# Patient Record
Sex: FEMALE | Race: BLACK OR AFRICAN AMERICAN | NOT HISPANIC OR LATINO | URBAN - METROPOLITAN AREA
[De-identification: names, ages, dates, MRNs, and addresses within clinical notes are randomized per-mention and may not be internally consistent; named-entity substitution may affect disease eponyms.]

---

## 2017-10-24 ENCOUNTER — AMBULATORY SURGICAL CENTER (AMBULATORY)
Dept: URBAN - METROPOLITAN AREA SURGERY 17 | Facility: SURGERY | Age: 47
End: 2017-10-24
Payer: COMMERCIAL

## 2017-10-24 ENCOUNTER — OFFICE (AMBULATORY)
Dept: URBAN - METROPOLITAN AREA CLINIC 64 | Facility: CLINIC | Age: 47
End: 2017-10-24
Payer: COMMERCIAL

## 2017-10-24 VITALS
SYSTOLIC BLOOD PRESSURE: 142 MMHG | DIASTOLIC BLOOD PRESSURE: 81 MMHG | OXYGEN SATURATION: 100 % | RESPIRATION RATE: 20 BRPM | HEART RATE: 79 BPM

## 2017-10-24 VITALS
HEART RATE: 81 BPM | RESPIRATION RATE: 20 BRPM | DIASTOLIC BLOOD PRESSURE: 86 MMHG | OXYGEN SATURATION: 98 % | SYSTOLIC BLOOD PRESSURE: 142 MMHG

## 2017-10-24 VITALS
DIASTOLIC BLOOD PRESSURE: 75 MMHG | SYSTOLIC BLOOD PRESSURE: 118 MMHG | RESPIRATION RATE: 17 BRPM | HEART RATE: 72 BPM | OXYGEN SATURATION: 100 %

## 2017-10-24 VITALS
RESPIRATION RATE: 14 BRPM | HEART RATE: 74 BPM | DIASTOLIC BLOOD PRESSURE: 95 MMHG | SYSTOLIC BLOOD PRESSURE: 143 MMHG | TEMPERATURE: 98.5 F | OXYGEN SATURATION: 96 %

## 2017-10-24 VITALS
RESPIRATION RATE: 19 BRPM | SYSTOLIC BLOOD PRESSURE: 118 MMHG | DIASTOLIC BLOOD PRESSURE: 75 MMHG | HEART RATE: 77 BPM | OXYGEN SATURATION: 99 %

## 2017-10-24 VITALS
OXYGEN SATURATION: 97 % | RESPIRATION RATE: 16 BRPM | HEART RATE: 73 BPM | SYSTOLIC BLOOD PRESSURE: 134 MMHG | DIASTOLIC BLOOD PRESSURE: 88 MMHG

## 2017-10-24 VITALS
RESPIRATION RATE: 20 BRPM | DIASTOLIC BLOOD PRESSURE: 82 MMHG | SYSTOLIC BLOOD PRESSURE: 133 MMHG | OXYGEN SATURATION: 87 % | HEART RATE: 82 BPM

## 2017-10-24 VITALS
HEART RATE: 78 BPM | RESPIRATION RATE: 16 BRPM | SYSTOLIC BLOOD PRESSURE: 131 MMHG | DIASTOLIC BLOOD PRESSURE: 77 MMHG | OXYGEN SATURATION: 99 %

## 2017-10-24 VITALS — HEART RATE: 72 BPM | OXYGEN SATURATION: 98 %

## 2017-10-24 VITALS
OXYGEN SATURATION: 100 % | HEART RATE: 77 BPM | SYSTOLIC BLOOD PRESSURE: 131 MMHG | RESPIRATION RATE: 15 BRPM | DIASTOLIC BLOOD PRESSURE: 74 MMHG

## 2017-10-24 VITALS
HEART RATE: 77 BPM | SYSTOLIC BLOOD PRESSURE: 127 MMHG | DIASTOLIC BLOOD PRESSURE: 93 MMHG | OXYGEN SATURATION: 100 % | RESPIRATION RATE: 11 BRPM

## 2017-10-24 VITALS — RESPIRATION RATE: 16 BRPM | OXYGEN SATURATION: 97 % | TEMPERATURE: 96.8 F | HEART RATE: 83 BPM

## 2017-10-24 DIAGNOSIS — K21.0 GASTRO-ESOPHAGEAL REFLUX DISEASE WITH ESOPHAGITIS: ICD-10-CM

## 2017-10-24 DIAGNOSIS — K29.70 GASTRITIS, UNSPECIFIED, WITHOUT BLEEDING: ICD-10-CM

## 2017-10-24 DIAGNOSIS — Z12.11 ENCOUNTER FOR SCREENING FOR MALIGNANT NEOPLASM OF COLON: ICD-10-CM

## 2017-10-24 DIAGNOSIS — K64.0 FIRST DEGREE HEMORRHOIDS: ICD-10-CM

## 2017-10-24 DIAGNOSIS — K29.50 UNSPECIFIED CHRONIC GASTRITIS WITHOUT BLEEDING: ICD-10-CM

## 2017-10-24 DIAGNOSIS — K57.30 DIVERTICULOSIS OF LARGE INTESTINE WITHOUT PERFORATION OR ABS: ICD-10-CM

## 2017-10-24 LAB
GI HISTOLOGY: A. SELECT: (no result)
GI HISTOLOGY: B. SELECT: (no result)
GI HISTOLOGY: PDF REPORT: (no result)

## 2017-10-24 PROCEDURE — 43239 EGD BIOPSY SINGLE/MULTIPLE: CPT | Performed by: INTERNAL MEDICINE

## 2017-10-24 PROCEDURE — 45378 DIAGNOSTIC COLONOSCOPY: CPT | Performed by: INTERNAL MEDICINE

## 2017-10-24 PROCEDURE — 88305 TISSUE EXAM BY PATHOLOGIST: CPT | Performed by: INTERNAL MEDICINE

## 2017-10-24 RX ADMIN — PROPOFOL 50 MG: 10 INJECTION, EMULSION INTRAVENOUS at 13:50

## 2017-10-24 RX ADMIN — PROPOFOL 50 MG: 10 INJECTION, EMULSION INTRAVENOUS at 13:39

## 2017-10-24 RX ADMIN — PROPOFOL 100 MG: 10 INJECTION, EMULSION INTRAVENOUS at 13:36

## 2017-10-24 RX ADMIN — PROPOFOL 50 MG: 10 INJECTION, EMULSION INTRAVENOUS at 13:48

## 2017-10-24 RX ADMIN — PROPOFOL 50 MG: 10 INJECTION, EMULSION INTRAVENOUS at 13:43

## 2017-10-24 RX ADMIN — PROPOFOL 50 MG: 10 INJECTION, EMULSION INTRAVENOUS at 13:54

## 2017-10-24 RX ADMIN — PROPOFOL 25 MG: 10 INJECTION, EMULSION INTRAVENOUS at 13:37

## 2017-10-24 RX ADMIN — PROPOFOL 25 MG: 10 INJECTION, EMULSION INTRAVENOUS at 13:46

## 2017-10-24 RX ADMIN — LIDOCAINE HYDROCHLORIDE 25 MG: 10 INJECTION, SOLUTION EPIDURAL; INFILTRATION; INTRACAUDAL; PERINEURAL at 13:34

## 2017-10-24 RX ADMIN — PROPOFOL 50 MG: 10 INJECTION, EMULSION INTRAVENOUS at 13:37

## 2017-10-24 NOTE — SERVICEHPINOTES
Patient presents for a Bypass screening Colonoscopy.  New patient paperwork has been reviewed.Pre-operatively, I reviewed the indication(s) for the procedure, the risks of the procedure [including but not limited to: unexpected bleeding possibly requiring hospitalization and/or an unplanned repeat colonoscopy, perforation possibly requiring surgical treatment, missed lesions and complications of sedation/MAC [also explained by anesthesia staff].    Having an EGD also because of heartburn.  She mentions some abdominal pain at times ( menstrual like ),,She had CT this month and states it was "unremarkable" ****BR

## 2017-10-24 NOTE — SERVICENOTES
Patient with significant sigmoid tortuousity,,,,I suspect this is causing a lot of her abdominal pain, bowel concerns. ??? underlying adhesions ? ? 
Patient to f/u in office so we can review studies,,,and review her previous CT. 

Patient understands associated risk, benefit of endoscopy including bleeding, infection, missed polyp or missed cancer, perforation, missed lesion, death. Withdrawal time was > / = 6 minutes.

## 2017-12-06 ENCOUNTER — OFFICE (AMBULATORY)
Dept: URBAN - METROPOLITAN AREA CLINIC 75 | Facility: CLINIC | Age: 47
End: 2017-12-06

## 2017-12-06 VITALS — HEART RATE: 80 BPM | DIASTOLIC BLOOD PRESSURE: 92 MMHG | WEIGHT: 226 LBS | SYSTOLIC BLOOD PRESSURE: 142 MMHG

## 2017-12-06 DIAGNOSIS — K20.9 ESOPHAGITIS, UNSPECIFIED: ICD-10-CM

## 2017-12-06 DIAGNOSIS — K57.30 DIVERTICULOSIS OF LARGE INTESTINE WITHOUT PERFORATION OR ABS: ICD-10-CM

## 2017-12-06 PROCEDURE — 99213 OFFICE O/P EST LOW 20 MIN: CPT | Performed by: INTERNAL MEDICINE

## 2018-12-10 ENCOUNTER — OFFICE (AMBULATORY)
Dept: URBAN - METROPOLITAN AREA CLINIC 75 | Facility: CLINIC | Age: 48
End: 2018-12-10

## 2018-12-10 VITALS
HEIGHT: 62 IN | HEART RATE: 84 BPM | DIASTOLIC BLOOD PRESSURE: 82 MMHG | WEIGHT: 208 LBS | RESPIRATION RATE: 16 BRPM | SYSTOLIC BLOOD PRESSURE: 122 MMHG

## 2018-12-10 DIAGNOSIS — K57.30 DIVERTICULOSIS OF LARGE INTESTINE WITHOUT PERFORATION OR ABS: ICD-10-CM

## 2018-12-10 DIAGNOSIS — K21.9 GASTRO-ESOPHAGEAL REFLUX DISEASE WITHOUT ESOPHAGITIS: ICD-10-CM

## 2018-12-10 PROCEDURE — 99213 OFFICE O/P EST LOW 20 MIN: CPT | Performed by: INTERNAL MEDICINE

## 2021-11-29 ENCOUNTER — TELEPHONE (OUTPATIENT)
Dept: ORTHOPEDIC SURGERY | Facility: CLINIC | Age: 51
End: 2021-11-29

## 2021-11-29 NOTE — TELEPHONE ENCOUNTER
HUB STAFF ATTEMPTED WARM TRANSFER TO [facility] FOR URGENT SCHEDULING (DUE TO CONSTANT SWELLING SINCE OCT 2021) - NO ANSWER      Caller: Genesis Landrum    Relationship to patient: Self    Best call back number: [phone]     Chief complaint: WANTS NEW PATIENT / 2ND OPINION / RIGHT KNEE APPT     Type of visit: NEW PATIENT / 2ND OPINION / RIGHT KNEE OSTEOARTHRITIS / CONSTANT PAIN & SWELLING SINCE 10/31/21 / NO IMAGING YET / NO PRIOR RIGHT KNEE SURGERY     Requested date: GENERALLY AVAILABLE ANY DAY, PREFERS LATE AFTERNOONS, DEPENDS ON WORK SCHEDULE     Additional notes: PREVIOUSLY SAW DR. GABRIEL BENSON OCT 2021 @[facility] [address] [place] [address] PH: [phone] (NO RIGHT KNEE XR NOR ASPIRATION DONE, JUST RIGHT KNEE CORTISONE INJECTION GIVEN). PATIENT GIVEN [facility] FAX # TO HAVE OFC NOTE(s) FAXED     PATIENT WOULD LIKE TO SEE DR. WESTON OR MAXINE (1ST AVAILABLE @ ANY OFFICE, ALTHO WOULD PREFER [facility] OR [facility])     REFERRAL [id number] CREATED & PUT IN SCHEDULE REVIEW FOR OFFICE SCHEDULING     PATIENT'S Best call back number: [phone]     THANKS

## 2021-12-02 ENCOUNTER — OFFICE VISIT (OUTPATIENT)
Dept: ORTHOPEDIC SURGERY | Facility: CLINIC | Age: 51
End: 2021-12-02

## 2021-12-02 VITALS — TEMPERATURE: 98.6 F | HEIGHT: 62 IN | BODY MASS INDEX: 42.36 KG/M2 | WEIGHT: 230.2 LBS

## 2021-12-02 DIAGNOSIS — S83.241A ACUTE MEDIAL MENISCUS TEAR OF RIGHT KNEE, INITIAL ENCOUNTER: Primary | ICD-10-CM

## 2021-12-02 DIAGNOSIS — M25.561 RIGHT KNEE PAIN, UNSPECIFIED CHRONICITY: ICD-10-CM

## 2021-12-02 PROCEDURE — 73562 X-RAY EXAM OF KNEE 3: CPT | Performed by: NURSE PRACTITIONER

## 2021-12-02 PROCEDURE — 99204 OFFICE O/P NEW MOD 45 MIN: CPT | Performed by: NURSE PRACTITIONER

## 2021-12-02 RX ORDER — CETIRIZINE HYDROCHLORIDE 5 MG/1
5 TABLET ORAL DAILY
COMMUNITY

## 2021-12-02 RX ORDER — IBUPROFEN 200 MG
200 TABLET ORAL EVERY 6 HOURS PRN
COMMUNITY
End: 2022-05-11 | Stop reason: ALTCHOICE

## 2021-12-02 RX ORDER — HYDROCHLOROTHIAZIDE 25 MG/1
25 TABLET ORAL DAILY
COMMUNITY
Start: 2021-11-15

## 2021-12-02 RX ORDER — MELOXICAM 15 MG/1
TABLET ORAL
Qty: 30 TABLET | Refills: 0 | Status: SHIPPED | OUTPATIENT
Start: 2021-12-02 | End: 2021-12-28

## 2021-12-02 RX ORDER — NORGESTIMATE AND ETHINYL ESTRADIOL 7DAYSX3 28
KIT ORAL
COMMUNITY
Start: 2021-11-22

## 2021-12-02 NOTE — PROGRESS NOTES
[facility]  New Patient      Patient Name: Genesis Landrum  : 1970  Primary Care Physician: Loren Pinedo MD        Chief Complaint: Right knee pain    HPI:   Genesis Landrum is a 51 y.o. year old who presents today for evaluation of right knee pain.  Patient comes to us for evaluation and treatment of right knee pain.  She has a history of left knee arthroscopy for torn meniscus performed by Dr. Novoa earlier this year and did well.  He says that her right knee is typically her good knee she is not had any problems with it since 2018 or  but started bothering her again about a month ago.  He went to see Dr. Claros and received a cortisone injection in both knees 1 month ago.  She says the left knee felt much better however the right knee did not get any relief from that injection injection whatsoever.    She says the right knee has a lot of swelling and pain, she has instability as well as nighttime symptoms.  Her pain is worse with activity only somewhat better with rest.  She endorses some stiffness.  She does feel like it gets stuck and locked up on her a bit at times.    She is done physical therapy on both of her knees within the last year.  She also tries to remain active and is tried to continue with swimming and Pilates however she feels like this is making her knee pain worse at this time.  She is taken ibuprofen for her symptoms all without improvement in a lasting fashion.    Past Medical/Surgical, Social and Family History:  I have reviewed and/or updated pertinent history as noted in the medical record including:  History reviewed. No pertinent past medical history.  Past Surgical History:   Procedure Laterality Date   • KNEE SURGERY       Social History     Occupational History   • Not on file   Tobacco Use   • Smoking status: Former Smoker     Quit date:      Years since quitting: 10.9   • Smokeless tobacco: Never Used   Vaping Use   • Vaping Use: Never used   Substance and Sexual  Activity   • Alcohol use: Not on file     Comment: weekends   • Drug use: Defer   • Sexual activity: Defer      Social History     Social History Narrative   • Not on file     Family History   Problem Relation Age of Onset   • Diabetes Mother    • Cancer Maternal Aunt         breast       Allergies:   Allergies   Allergen Reactions   • Shellfish-Derived Products Nausea And Vomiting       Medications:   Home Medications:  Current Outpatient Medications on File Prior to Visit   Medication Sig   • cetirizine (zyrTEC) 5 MG tablet Take 5 mg by mouth Daily.   • hydroCHLOROthiazide (HYDRODIURIL) 25 MG tablet Take 25 mg by mouth Daily.   • ibuprofen (ADVIL,MOTRIN) 200 MG tablet Take 200 mg by mouth Every 6 (Six) Hours As Needed for Mild Pain .   • Tri-Sprintec 0.18/0.215/0.25 MG-35 MCG per tablet TAKE 1 TABLET BY MOUTH 1 TIME EACH DAY     No current facility-administered medications on file prior to visit.         ROS:  14 point review of systems was negative except as listed in the HPI and fatigue and weight change.    Physical Exam:   51 y.o. female  Body mass index is 42.1 kg/m²., 104 kg (230 lb 3.2 oz)  Vitals:    12/02/21 1401   Temp: 98.6 °F (37 °C)     General: Alert, cooperative, appears well and in no observable distress. Appears stated age and BMI as listed above.  HEENT: Normocephalic, atraumatic on external visual inspection.  CV: No significant peripheral edema.  Respiratory: Normal respiratory effort.  Skin: Warm & well perfused; appropriate skin turgor.  Psych: Appropriate mood & affect.  Neuro: Gross sensation and motor intact in affected extremity/extremities.  Vascular: Peripheral pulses palpable in affected extremity/extremities.     MSK Exam:  RIGHT Knee  No wounds, no gross deformity. 1+ effusion. Tenderness to palpation medial compartment, patellofemoral compartment ROM 2 to 110 and asymmetric with contralateral extremity Grossly stable ligamentous exam Misty Positive Medial Bounce Home  positive      Brief hip exam in the affected extremity(ies) grossly unremarkable. No groin pain elicited. Bilateral ankles with painless ROM, grossly symmetric. Calves soft and compressible, compartments non-tender in B/L lower extremities.      Radiology:    The following X-rays were ordered/reviewed today to evaluate the patient's symptoms: Single Knee: AP standing and sunrise views of both knees, and lateral view of painful knee show On the AP view patient has moderate medial compartment narrowing bilaterally which seems about symmetric, perhaps her left knee slightly more narrow than the right.  She is got more significant patellofemoral degenerative changes with some evidence of subchondral cyst behind the kneecap.  Sunrise view in both knees show slight lateralization left more so than right with degenerative changes moderate.. There are no prior films available for direct comparison.    Procedure:   N/A      Misc. Data/Labs: N/A    Assessment & Plan:    This is a 51-year-old female with significant right knee pain.  She does have some degenerative changes but she also certainly has symptoms which sound more mechanical in nature.  I worry about a medial meniscus tear perhaps a loose body in the joint.  Given the fact she got no relief from the injection of cortisone 1 month ago would make me think this is more mechanical rather than isolated degenerative pathology.     Plan is to proceed with an MRI of the right knee she has failed conservative management.  I am going to enter in a prescription for meloxicam to so she is not having to take multiple doses of ibuprofen during the day in an effort to at least moderately control her pain.  We talked about avoiding activities which aggravate her pain and I would really encourage her not to try and push through any activities.  She could get in the pool and do some light walking wise I would continue to rest, ice and use the brace as needed.  She could certainly  even consider picking up a cane or crutch in the opposite arm to take some of the pressure off the knee as needed.    Review her MRI and consider referring her to Dr. Perry as needed if arthroscopy seems like it might be a consideration or will consider additional treatments at that time.    Patient encouraged to call with questions or concerns prior to follow up. , Patient instructed on appropriate use of NSAIDs and signs/symptoms of adverse reactions. Patient advised to stop medications and notify the office in the event of any noted side effects.  and Patient instructed on appropriate use of ICE and/or HEAT therapy, alternating for symptoms.       ICD-10-CM ICD-9-CM   1. Acute medial meniscus tear of right knee, initial encounter  S83.241A 836.0   2. Right knee pain, unspecified chronicity  M25.561 719.46     New Medications Ordered This Visit   Medications   • meloxicam (MOBIC) 15 MG tablet     Si PO Daily with food. Do not take with other NSAIDS.     Dispense:  30 tablet     Refill:  0     Orders Placed This Encounter   Procedures   • XR Knee 3 View Right   • MRI Knee Right Without Contrast     Return for After the MRI.    FILIBERTO Mcmahon      Dictated Utilizing Dragon Dictation

## 2021-12-28 ENCOUNTER — APPOINTMENT (OUTPATIENT)
Dept: MRI IMAGING | Facility: HOSPITAL | Age: 51
End: 2021-12-28

## 2021-12-28 RX ORDER — MELOXICAM 15 MG/1
TABLET ORAL
Qty: 30 TABLET | Refills: 0 | Status: SHIPPED | OUTPATIENT
Start: 2021-12-28 | End: 2022-02-07

## 2021-12-30 ENCOUNTER — HOSPITAL ENCOUNTER (OUTPATIENT)
Dept: MRI IMAGING | Facility: HOSPITAL | Age: 51
Discharge: HOME | End: 2021-12-30
Admitting: NURSE PRACTITIONER

## 2021-12-30 DIAGNOSIS — M25.561 RIGHT KNEE PAIN, UNSPECIFIED CHRONICITY: ICD-10-CM

## 2021-12-30 DIAGNOSIS — S83.241A ACUTE MEDIAL MENISCUS TEAR OF RIGHT KNEE, INITIAL ENCOUNTER: ICD-10-CM

## 2021-12-30 PROCEDURE — 73721 MRI JNT OF LWR EXTRE W/O DYE: CPT

## 2022-01-07 ENCOUNTER — OFFICE VISIT (OUTPATIENT)
Dept: ORTHOPEDIC SURGERY | Facility: CLINIC | Age: 52
End: 2022-01-07

## 2022-01-07 VITALS — BODY MASS INDEX: 42.33 KG/M2 | WEIGHT: 230 LBS | HEIGHT: 62 IN | TEMPERATURE: 98 F

## 2022-01-07 DIAGNOSIS — M17.11 PRIMARY LOCALIZED OSTEOARTHROSIS OF RIGHT LOWER LEG: Primary | ICD-10-CM

## 2022-01-07 PROCEDURE — 99214 OFFICE O/P EST MOD 30 MIN: CPT | Performed by: ORTHOPAEDIC SURGERY

## 2022-01-07 NOTE — PROGRESS NOTES
"Right Knee MRI Follow Up      Patient: Genesis Landrum        YOB: 1970            Chief Complaints: right Knee pain      History of Present Illness: The patient is here follow-up of an MRI of the knee this is a patient that had right knee pain intermittently since 2019 she is a  she states over the last several months has had worsening of her pain no new history injury change in activity she does have swelling her pain is primarily medial she does have night pain. She did see Devyn who did a steroid injection with no real lasting relief and she ordered an MRI. She is here today follow-up MRI. MRI does demonstrate a medial meniscus tear but as well and more importantly she does have some extrusion of the medial meniscus she has full-thickness cartilage loss at the medial patellofemoral compartments her symptoms are moderate to severe depending on her activity worse with activity somewhat better with restShe has done therapy on both of her knees in the last year she remains active she does Pilates her past medical history is unremarkable other than obesity she has a BMI of 42      Physical Exam: 51 y.o. female  General Appearance:    Alert, cooperative, in no acute distress                   Vitals:    01/07/22 1318   Temp: 98 °F (36.7 °C)   Weight: 104 kg (230 lb)   Height: 157.5 cm (62\")   PainSc:   5        Patient is alert and read ×3 no acute distress appears her above-listed at height weight and age.  Affect is normal respiratory rate is normal unlabored. Heart rate regular rate rhythm, sclera, dentition and hearing are normal for the purpose of this exam.      Ortho Exam Physical exam of the right knee reveals no effusion, no erythema.  It mild loss of extension and full flexion  Patient has mild varus alignment.  They have mild tenderness to palpation about the medial compartment, no tenderness laterally..  The patient has a negative bounce home, negative Misty and a stable " ligamentous exam.  Quad tone is reasonable and symmetric.  There are no overlying skin changes no lymphedema no lymphadenopathy.  There is good hip range of motion which is full symmetric and asymptomatic and a normal ankle exam.    Did review previous x-rays she does have some narrowing of the joint space maybe about 25 to 50% of joint space remaining on the right medial side with some sclerosis and osteophyte formation also mild to moderate patellofemoral OA. MRIs as above and I have reviewed myself and agree and reviewed with the patient  MRI Results: MRI is as above    Procedures      Assessment/Plan: Right knee medial meniscus tear and arthritis. I do not think should benefit from arthroscopy and any lasting fashion. We did talk about her need for probable need for arthroplasty in the future we will get approval for gel is a little early for repeat steroid I will see her back after approval for gel

## 2022-01-19 ENCOUNTER — TELEPHONE (OUTPATIENT)
Dept: ORTHOPEDIC SURGERY | Facility: CLINIC | Age: 52
End: 2022-01-19

## 2022-01-19 NOTE — TELEPHONE ENCOUNTER
Caller: MONIKA ANTON   Relationship to Patient: SELF     Phone Number: [phone]  Reason for Call: PATIENT CALLING BACK TO SPEAK WITH REYMUNDO, ATTEMPTED TO WARM TRANSFER

## 2022-01-19 NOTE — TELEPHONE ENCOUNTER
Please tell her that the gel injections are not covered that her options would be steroid injection activity modification and then we probably should have her see Dr. Anne

## 2022-01-19 NOTE — TELEPHONE ENCOUNTER
Gel injections are not covered under Mrs. Landrum's plan:(     I have left a message letting patient know gel is not covered, but could someone reach out to patient regarding next step to help with knee pain.     Thank you

## 2022-02-07 RX ORDER — MELOXICAM 15 MG/1
TABLET ORAL
Qty: 30 TABLET | Refills: 0 | Status: SHIPPED | OUTPATIENT
Start: 2022-02-07 | End: 2022-05-11 | Stop reason: ALTCHOICE

## 2022-05-11 RX ORDER — MELOXICAM 15 MG/1
TABLET ORAL
Qty: 30 TABLET | Refills: 0 | Status: SHIPPED | OUTPATIENT
Start: 2022-05-11

## 2022-05-20 ENCOUNTER — CLINICAL SUPPORT (OUTPATIENT)
Dept: ORTHOPEDIC SURGERY | Facility: CLINIC | Age: 52
End: 2022-05-20

## 2022-05-20 VITALS — BODY MASS INDEX: 41.42 KG/M2 | HEIGHT: 62 IN | TEMPERATURE: 97.3 F | WEIGHT: 225.1 LBS

## 2022-05-20 DIAGNOSIS — M17.11 PRIMARY OSTEOARTHRITIS OF RIGHT KNEE: Primary | ICD-10-CM

## 2022-05-20 PROCEDURE — 20610 DRAIN/INJ JOINT/BURSA W/O US: CPT | Performed by: ORTHOPAEDIC SURGERY

## 2022-05-20 PROCEDURE — 99212 OFFICE O/P EST SF 10 MIN: CPT | Performed by: ORTHOPAEDIC SURGERY

## 2022-05-20 RX ORDER — VALACYCLOVIR HYDROCHLORIDE 1 G/1
1000 TABLET, FILM COATED ORAL DAILY
COMMUNITY
Start: 2022-01-25

## 2022-05-20 RX ADMIN — METHYLPREDNISOLONE ACETATE 80 MG: 80 INJECTION, SUSPENSION INTRA-ARTICULAR; INTRALESIONAL; INTRAMUSCULAR; SOFT TISSUE at 13:56

## 2022-05-20 RX ADMIN — LIDOCAINE HYDROCHLORIDE 2 ML: 20 INJECTION, SOLUTION EPIDURAL; INFILTRATION; INTRACAUDAL; PERINEURAL at 13:56

## 2022-05-20 NOTE — PROGRESS NOTES
Patient: Genesis Landrum  YOB: 1970  Date of Service: 2022    Chief Complaints: Right knee pain    Subjective:    History of Present Illness: Pt is seen in the office today with complaints of right knee pain I last saw her in January we did an MRI which showed a medial meniscus tear as well as significant degenerative changes her last steroid injection was in the fall we did try to get approval for [employer] insurance denied it however when she called her insurance her insurance told her if we said it is medically necessary to be covered I will try to resubmit this.          Allergies:   Allergies   Allergen Reactions   • Shellfish-Derived Products Nausea And Vomiting       Medications:   Home Medications:  Current Outpatient Medications on File Prior to Visit   Medication Sig   • cetirizine (zyrTEC) 5 MG tablet Take 5 mg by mouth Daily.   • hydroCHLOROthiazide (HYDRODIURIL) 25 MG tablet Take 25 mg by mouth Daily.   • meloxicam (MOBIC) 15 MG tablet 1 PO Daily with food.   • Tri-Sprintec 0.18/0.215/0.25 MG-35 MCG per tablet TAKE 1 TABLET BY MOUTH 1 TIME EACH DAY     No current facility-administered medications on file prior to visit.     Current Medications:  Scheduled Meds:  Continuous Infusions:No current facility-administered medications for this visit.    PRN Meds:.    I have reviewed the patient's medical history in detail and updated the computerized patient record.  Review and summarization of old records include:    No past medical history on file.     Past Surgical History:   Procedure Laterality Date   • KNEE SURGERY          Social History     Occupational History   • Not on file   Tobacco Use   • Smoking status: Former Smoker     Quit date:      Years since quittin.3   • Smokeless tobacco: Never Used   Vaping Use   • Vaping Use: Never used   Substance and Sexual Activity   • Alcohol use: Not on file     Comment: weekends   • Drug use: Defer   • Sexual activity: Defer      Social  History     Social History Narrative   • Not on file        Family History   Problem Relation Age of Onset   • Diabetes Mother    • Cancer Maternal Aunt         breast       ROS: 14 point review of systems was performed and was negative except for documented findings in HPI and today's encounter.     Allergies:   Allergies   Allergen Reactions   • Shellfish-Derived Products Nausea And Vomiting     Constitutional:  Denies fever, shaking or chills   Eyes:  Denies change in visual acuity   HENT:  Denies nasal congestion or sore throat   Respiratory:  Denies cough or shortness of breath   Cardiovascular:  Denies chest pain or severe LE edema   GI:  Denies abdominal pain, nausea, vomiting, bloody stools or diarrhea   Musculoskeletal:  Numbness, tingling, or loss of motor function only as noted above in history of present illness.  : Denies painful urination or hematuria  Integument:  Denies rash, lesion or ulceration   Neurologic:  Denies headache or focal weakness  Endocrine:  Denies lymphadenopathy  Psych:  Denies confusion or change in mental status   Hem:  Denies active bleeding      Physical Exam: 51 y.o. female  Wt Readings from Last 3 Encounters:   01/07/22 104 kg (230 lb)   12/30/21 104 kg (230 lb)   12/02/21 104 kg (230 lb 3.2 oz)       There is no height or weight on file to calculate BMI.  No height and weight on file for this encounter.  There were no vitals filed for this visit.  Vital signs reviewed.   General Appearance:    Alert, cooperative, in no acute distress                    Ortho exam    Physical exam of the right  knee reveals no effusion no redness.  The patient does have tenderness about the medial l joint line.  No tenderness about the lateral l joint line.  A negative bounce home and a positive l medial Misty.    Patient has a stable ligamentous exam.  The patient has a negative Lachman and negative anterior drawer and a negative pivot shift.  Quads are reasonable and symmetric  bilaterally.  Calf is soft and nontender.  There is no overlying skin changes no lymphedema lymphadenopathy.  Patient has good hip range of motion full symmetric and asymptomatic and a normal ankle exam.  She has good distal pulses and sensation distally is intact             .time    Assessment: Right knee pain.  She does have a medial meniscus tear but also has degenerative changes which I think is her source plan is to proceed with an injection she states she contacted her insurance company and they said they would approved gel if we said it was medically necessary.  I told her if she has another tool in her belt we will try to resubmit   plan:   Follow up as indicated.  Ice, elevate, and rest as needed.  Discussed conservative measures of pain control including ice, bracing.  Also talked about the importance of strengthening and maintaining ideal body weight    Danyelle Perry M.D.    Large Joint Arthrocentesis: R knee  Date/Time: 5/20/2022 1:56 PM  Consent given by: patient  Site marked: site marked  Timeout: Immediately prior to procedure a time out was called to verify the correct patient, procedure, equipment, support staff and site/side marked as required   Supporting Documentation  Indications: pain, joint swelling and diagnostic evaluation   Procedure Details  Location: knee - R knee  Preparation: Patient was prepped and draped in the usual sterile fashion  Needle gauge: 21G.  Medications administered: 80 mg methylPREDNISolone acetate 80 MG/ML; 2 mL lidocaine PF 2% 2 %  Patient tolerance: patient tolerated the procedure well with no immediate complications

## 2022-05-23 RX ORDER — METHYLPREDNISOLONE ACETATE 80 MG/ML
80 INJECTION, SUSPENSION INTRA-ARTICULAR; INTRALESIONAL; INTRAMUSCULAR; SOFT TISSUE
Status: COMPLETED | OUTPATIENT
Start: 2022-05-20 | End: 2022-05-20

## 2022-05-23 RX ORDER — LIDOCAINE HYDROCHLORIDE 20 MG/ML
2 INJECTION, SOLUTION EPIDURAL; INFILTRATION; INTRACAUDAL; PERINEURAL
Status: COMPLETED | OUTPATIENT
Start: 2022-05-20 | End: 2022-05-20

## 2022-07-06 ENCOUNTER — TELEPHONE (OUTPATIENT)
Dept: ORTHOPEDIC SURGERY | Facility: CLINIC | Age: 52
End: 2022-07-06

## 2022-07-06 NOTE — TELEPHONE ENCOUNTER
Visco Referral/Denial       Insurance has denied request for Visco injection and unfortunately with the First Level of Appeal, insurance upheld the decision of denial.  (Denial letter uploaded to  for your review)     Patient is not currently scheduled and the notes states Visco is to be used as another tool if necessary.       I will reach out to the patient regarding denial and insurance could possibly review our request after another round of cortisone.

## 2022-07-07 NOTE — TELEPHONE ENCOUNTER
Her other conservative measures would be just the steroid injections physical therapy ultimately I will get her to Dr. Anne

## 2022-09-06 ENCOUNTER — TELEPHONE (OUTPATIENT)
Dept: ORTHOPEDIC SURGERY | Facility: CLINIC | Age: 52
End: 2022-09-06

## 2022-09-06 NOTE — TELEPHONE ENCOUNTER
Caller: MONIKA     Relationship to patient: SELF    Best call back number: [phone]    Chief complaint: KNEE RIGHT     Type of visit: INJECTION     Requested date: NEXT AVAIL

## 2022-09-15 ENCOUNTER — CLINICAL SUPPORT (OUTPATIENT)
Dept: ORTHOPEDIC SURGERY | Facility: CLINIC | Age: 52
End: 2022-09-15

## 2022-09-15 VITALS — WEIGHT: 222 LBS | HEIGHT: 62 IN | RESPIRATION RATE: 16 BRPM | TEMPERATURE: 98 F | BODY MASS INDEX: 40.85 KG/M2

## 2022-09-15 DIAGNOSIS — M17.11 PRIMARY OSTEOARTHRITIS OF RIGHT KNEE: Primary | ICD-10-CM

## 2022-09-15 DIAGNOSIS — S83.241D ACUTE MEDIAL MENISCUS TEAR OF RIGHT KNEE, SUBSEQUENT ENCOUNTER: ICD-10-CM

## 2022-09-15 PROCEDURE — 20610 DRAIN/INJ JOINT/BURSA W/O US: CPT | Performed by: ORTHOPAEDIC SURGERY

## 2022-09-15 RX ORDER — OMEPRAZOLE 20 MG/1
CAPSULE, DELAYED RELEASE ORAL
COMMUNITY
Start: 2022-09-09

## 2022-09-15 RX ORDER — AMLODIPINE BESYLATE 5 MG/1
TABLET ORAL
COMMUNITY
Start: 2022-08-18

## 2022-09-15 RX ADMIN — METHYLPREDNISOLONE ACETATE 80 MG: 80 INJECTION, SUSPENSION INTRA-ARTICULAR; INTRALESIONAL; INTRAMUSCULAR; SOFT TISSUE at 16:31

## 2022-09-15 NOTE — PROGRESS NOTES
Patient: Genesis Landrum  YOB: 1970  Date of Service: 9/15/2022    Chief Complaints: Right knee pain    Subjective:    History of Present Illness: Pt is seen in the office today with complaints of right knee pain she gets intermittent injections and does well with those her last saw her in May she does have an MRI which shows a medial meniscus tear but pretty significant degenerative changes.          Allergies:   Allergies   Allergen Reactions   • Shellfish-Derived Products Nausea And Vomiting       Medications:   Home Medications:  Current Outpatient Medications on File Prior to Visit   Medication Sig   • cetirizine (zyrTEC) 5 MG tablet Take 5 mg by mouth Daily.   • hydroCHLOROthiazide (HYDRODIURIL) 25 MG tablet Take 25 mg by mouth Daily.   • meloxicam (MOBIC) 15 MG tablet 1 PO Daily with food.   • Probiotic Product (PROBIOTIC-10 PO) Take  by mouth.   • Tri-Sprintec 0.18/0.215/0.25 MG-35 MCG per tablet TAKE 1 TABLET BY MOUTH 1 TIME EACH DAY   • valACYclovir (VALTREX) 1000 MG tablet Take 1,000 mg by mouth Daily.     No current facility-administered medications on file prior to visit.     Current Medications:  Scheduled Meds:  Continuous Infusions:No current facility-administered medications for this visit.    PRN Meds:.    I have reviewed the patient's medical history in detail and updated the computerized patient record.  Review and summarization of old records include:    No past medical history on file.     Past Surgical History:   Procedure Laterality Date   • KNEE SURGERY          Social History     Occupational History   • Not on file   Tobacco Use   • Smoking status: Former Smoker     Quit date:      Years since quittin.7   • Smokeless tobacco: Never Used   Vaping Use   • Vaping Use: Never used   Substance and Sexual Activity   • Alcohol use: Not on file     Comment: weekends   • Drug use: Defer   • Sexual activity: Defer      Social History     Social History Narrative   • Not on file         Family History   Problem Relation Age of Onset   • Diabetes Mother    • Cancer Maternal Aunt         breast       ROS: 14 point review of systems was performed and was negative except for documented findings in HPI and today's encounter.     Allergies:   Allergies   Allergen Reactions   • Shellfish-Derived Products Nausea And Vomiting     Constitutional:  Denies fever, shaking or chills   Eyes:  Denies change in visual acuity   HENT:  Denies nasal congestion or sore throat   Respiratory:  Denies cough or shortness of breath   Cardiovascular:  Denies chest pain or severe LE edema   GI:  Denies abdominal pain, nausea, vomiting, bloody stools or diarrhea   Musculoskeletal:  Numbness, tingling, or loss of motor function only as noted above in history of present illness.  : Denies painful urination or hematuria  Integument:  Denies rash, lesion or ulceration   Neurologic:  Denies headache or focal weakness  Endocrine:  Denies lymphadenopathy  Psych:  Denies confusion or change in mental status   Hem:  Denies active bleeding      Physical Exam: 52 y.o. female  Wt Readings from Last 3 Encounters:   05/20/22 102 kg (225 lb 1.6 oz)   01/07/22 104 kg (230 lb)   12/30/21 104 kg (230 lb)       There is no height or weight on file to calculate BMI.  No height and weight on file for this encounter.  There were no vitals filed for this visit.  Vital signs reviewed.   General Appearance:    Alert, cooperative, in no acute distress                    Ortho exam      Exam is unchanged       .time    Assessment: Right knee OA    Plan: Injection she understands her options  Follow up as indicated.  Ice, elevate, and rest as needed.  Discussed conservative measures of pain control including ice, bracing.  Also talked about the importance of strengthening and maintaining ideal body weight    Danyelle Perry M.D.    Large Joint Arthrocentesis: R knee  Date/Time: 9/15/2022 4:31 PM  Consent given by: patient  Site marked: site  marked  Timeout: Immediately prior to procedure a time out was called to verify the correct patient, procedure, equipment, support staff and site/side marked as required   Supporting Documentation  Indications: pain, joint swelling and diagnostic evaluation   Procedure Details  Location: knee - R knee  Preparation: Patient was prepped and draped in the usual sterile fashion  Needle gauge: 21G.  Medications administered: 80 mg methylPREDNISolone acetate 80 MG/ML; 2 mL lidocaine (cardiac)  Patient tolerance: patient tolerated the procedure well with no immediate complications

## 2022-09-28 RX ORDER — METHYLPREDNISOLONE ACETATE 80 MG/ML
80 INJECTION, SUSPENSION INTRA-ARTICULAR; INTRALESIONAL; INTRAMUSCULAR; SOFT TISSUE
Status: COMPLETED | OUTPATIENT
Start: 2022-09-15 | End: 2022-09-15

## 2022-12-16 ENCOUNTER — CLINICAL SUPPORT (OUTPATIENT)
Dept: ORTHOPEDIC SURGERY | Facility: CLINIC | Age: 52
End: 2022-12-16

## 2022-12-16 VITALS — HEIGHT: 62 IN | TEMPERATURE: 97.7 F | BODY MASS INDEX: 42.34 KG/M2 | WEIGHT: 230.1 LBS

## 2022-12-16 DIAGNOSIS — M17.11 PRIMARY OSTEOARTHRITIS OF RIGHT KNEE: Primary | ICD-10-CM

## 2022-12-16 PROCEDURE — 73562 X-RAY EXAM OF KNEE 3: CPT | Performed by: ORTHOPAEDIC SURGERY

## 2022-12-16 PROCEDURE — 20610 DRAIN/INJ JOINT/BURSA W/O US: CPT | Performed by: ORTHOPAEDIC SURGERY

## 2022-12-16 RX ORDER — METHYLPREDNISOLONE ACETATE 80 MG/ML
80 INJECTION, SUSPENSION INTRA-ARTICULAR; INTRALESIONAL; INTRAMUSCULAR; SOFT TISSUE
Status: COMPLETED | OUTPATIENT
Start: 2022-12-16 | End: 2022-12-16

## 2022-12-16 RX ADMIN — METHYLPREDNISOLONE ACETATE 80 MG: 80 INJECTION, SUSPENSION INTRA-ARTICULAR; INTRALESIONAL; INTRAMUSCULAR; SOFT TISSUE at 14:50

## 2022-12-16 NOTE — PROGRESS NOTES
Patient: Genesis Landrum  YOB: 1970  Date of Service: 12/16/2022    Chief Complaints: Right knee pain    Subjective:    History of Present Illness: Pt is seen in the office today with complaints of right knee pain she has known degenerative changes she states that she did well with the injection but her symptoms have started back.   primarily medial moderate intermittent        Allergies:   Allergies   Allergen Reactions   • Shellfish-Derived Products Nausea And Vomiting       Medications:   Home Medications:  Current Outpatient Medications on File Prior to Visit   Medication Sig   • amLODIPine (NORVASC) 5 MG tablet    • cetirizine (zyrTEC) 5 MG tablet Take 5 mg by mouth Daily.   • hydroCHLOROthiazide (HYDRODIURIL) 25 MG tablet Take 25 mg by mouth Daily.   • meloxicam (MOBIC) 15 MG tablet 1 PO Daily with food.   • omeprazole (priLOSEC) 20 MG capsule    • Probiotic Product (PROBIOTIC-10 PO) Take  by mouth.   • Tri-Sprintec 0.18/0.215/0.25 MG-35 MCG per tablet TAKE 1 TABLET BY MOUTH 1 TIME EACH DAY   • valACYclovir (VALTREX) 1000 MG tablet Take 1,000 mg by mouth Daily.     No current facility-administered medications on file prior to visit.     Current Medications:  Scheduled Meds:  Continuous Infusions:No current facility-administered medications for this visit.    PRN Meds:.    I have reviewed the patient's medical history in detail and updated the computerized patient record.  Review and summarization of old records include:    No past medical history on file.     Past Surgical History:   Procedure Laterality Date   • KNEE SURGERY          Social History     Occupational History   • Not on file   Tobacco Use   • Smoking status: Some Days     Types: Cigars   • Smokeless tobacco: Never   Vaping Use   • Vaping Use: Never used   Substance and Sexual Activity   • Alcohol use: Not on file     Comment: weekends   • Drug use: Defer   • Sexual activity: Defer      Social History     Social History Narrative    • Not on file        Family History   Problem Relation Age of Onset   • Diabetes Mother    • Cancer Maternal Aunt         breast       ROS: 14 point review of systems was performed and was negative except for documented findings in HPI and today's encounter.     Allergies:   Allergies   Allergen Reactions   • Shellfish-Derived Products Nausea And Vomiting     Constitutional:  Denies fever, shaking or chills   Eyes:  Denies change in visual acuity   HENT:  Denies nasal congestion or sore throat   Respiratory:  Denies cough or shortness of breath   Cardiovascular:  Denies chest pain or severe LE edema   GI:  Denies abdominal pain, nausea, vomiting, bloody stools or diarrhea   Musculoskeletal:  Numbness, tingling, or loss of motor function only as noted above in history of present illness.  : Denies painful urination or hematuria  Integument:  Denies rash, lesion or ulceration   Neurologic:  Denies headache or focal weakness  Endocrine:  Denies lymphadenopathy  Psych:  Denies confusion or change in mental status   Hem:  Denies active bleeding      Physical Exam: 52 y.o. female  Wt Readings from Last 3 Encounters:   09/15/22 101 kg (222 lb)   05/20/22 102 kg (225 lb 1.6 oz)   01/07/22 104 kg (230 lb)       There is no height or weight on file to calculate BMI.  No height and weight on file for this encounter.  There were no vitals filed for this visit.  Vital signs reviewed.   General Appearance:    Alert, cooperative, in no acute distress                    Ortho exam  Exam is unchanged  X-rays AP lateral merchant view of the right knee were taken to evaluate her symptoms and compared x-rays done in December 21 she does have significant medial and severe significant patellofemoral arthritis of these have progressed a little since last x-ray           .time    Assessment: Right knee OA plan is to proceed with an injection I think she is at some point the near future will need a knee replacement I will probably have  her sit down to talk to Dr. Anne so she is basing her decision on fax    Plan:   Follow up as indicated.  Ice, elevate, and rest as needed.  Discussed conservative measures of pain control including ice, bracing.  Also talked about the importance of strengthening and maintaining ideal body weight    Danyelle Perry M.D.      Large Joint Arthrocentesis: R knee  Date/Time: 12/16/2022 2:50 PM  Consent given by: patient  Site marked: site marked  Timeout: Immediately prior to procedure a time out was called to verify the correct patient, procedure, equipment, support staff and site/side marked as required   Supporting Documentation  Indications: pain   Procedure Details  Location: knee - R knee  Preparation: Patient was prepped and draped in the usual sterile fashion  Needle gauge: 21G.  Approach: anteromedial  Medications administered: 80 mg methylPREDNISolone acetate 80 MG/ML; 2 mL lidocaine (cardiac)  Patient tolerance: patient tolerated the procedure well with no immediate complications

## 2023-02-16 ENCOUNTER — OFFICE VISIT (OUTPATIENT)
Dept: ORTHOPEDIC SURGERY | Facility: CLINIC | Age: 53
End: 2023-02-16
Payer: COMMERCIAL

## 2023-02-16 VITALS — HEIGHT: 62 IN | WEIGHT: 230 LBS | BODY MASS INDEX: 42.33 KG/M2 | TEMPERATURE: 97.8 F

## 2023-02-16 DIAGNOSIS — M17.11 PRIMARY OSTEOARTHRITIS OF RIGHT KNEE: Primary | ICD-10-CM

## 2023-02-16 PROCEDURE — 99213 OFFICE O/P EST LOW 20 MIN: CPT | Performed by: ORTHOPAEDIC SURGERY

## 2023-02-16 NOTE — PROGRESS NOTES
"Patient: Genesis Landrum  YOB: 1970 52 y.o. female  Medical Record Number: [id number]    Chief Complaints:   Chief Complaint   Patient presents with   • Right Knee - Initial Evaluation       History of Present Illness:Genesis Landrum is a 52 y.o. female who presents with c/o right medial knee pain has done reasonably well with previous injections which last for between 2 and 3 months.  She has a moderate to at times severe medial ache.  It limits walking tolerance.    Allergies:   Allergies   Allergen Reactions   • Shellfish-Derived Products Nausea And Vomiting       Medications:   Current Outpatient Medications   Medication Sig Dispense Refill   • amLODIPine (NORVASC) 5 MG tablet      • cetirizine (zyrTEC) 5 MG tablet Take 5 mg by mouth Daily.     • hydroCHLOROthiazide (HYDRODIURIL) 25 MG tablet Take 25 mg by mouth Daily.     • meloxicam (MOBIC) 15 MG tablet 1 PO Daily with food. 30 tablet 0   • omeprazole (priLOSEC) 20 MG capsule      • Tri-Sprintec 0.18/0.215/0.25 MG-35 MCG per tablet TAKE 1 TABLET BY MOUTH 1 TIME EACH DAY     • valACYclovir (VALTREX) 1000 MG tablet Take 1,000 mg by mouth Daily.     • Probiotic Product (PROBIOTIC-10 PO) Take  by mouth.       No current facility-administered medications for this visit.         The following portions of the patient's history were reviewed and updated as appropriate: allergies, current medications, past family history, past medical history, past social history, past surgical history and problem list.    Review of Systems:   Pertinent positives/negatives listed in HPI above    Physical Exam:   Vitals:    02/16/23 1528   Temp: 97.8 °F (36.6 °C)   TempSrc: Temporal   Weight: 104 kg (230 lb)   Height: 157.5 cm (62.01\")       General: A and O x 3, ASA, NAD      Knee Exam List: Knee:  right    ALIGNMENT:     Varus  ,   Patella  tracks  midline    GAIT:    Antalgic    SKIN:    No abnormality    RANGE OF MOTION:   3  -  120   DEG    STRENGTH:   4  / " 5    LIGAMENTS:    No varus / valgus instability.   Negative  Lachman.    MENISCUS:     Negative   Misty       DISTAL PULSES:    Paplable    DISTAL SENSATION :   Intact    LYMPHATICS:     No   lymphadenopathy    OTHER:          - Positive   effusion      - Crepitance with ROM          Radiology:  Xrays 3views right knee (ap,lateral, sunrise) taken previously demonstrating advanced varus osteoarthritis medial near with bone on bone articulation, subchondral cysts, and periarticular osteophytes    Assessment/Plan: Right knee moderately severe osteoarthritis we will continue with injections as needed, meloxicam as needed physical therapy exercises and I gave her a lateral heel wedge today to offload the lateral joint.  She will call back if she needs further injections      There are no diagnoses linked to this encounter.     Bashir Anne MD  2/16/2023

## 2023-03-16 NOTE — PROGRESS NOTES
Patient: Genesis Landrum  YOB: 1970  Date of Service: 3/16/2023    Chief Complaints: Right knee pain    Subjective:    History of Present Illness: Pt is seen in the office today with complaints of right knee pain I saw her in December she has significant degenerative changes we injected her she did see Dr. Anne who thought she was not ready for knee replacement yet she needed to do her strengthening get her weight down which she is working on she states her knee has been telling her the last week she would like an injection.  Told her I am happy to see her or she can follow-up with Dr. Anne as she is going to ask her primary care about some medicine for weight loss.  We had a long discussion regarding ways to lose weight and the fact that it is the numbers [place] it is better to make lifestyle changes instead of do the medication that does not allow for true lifestyle changes        Allergies:   Allergies   Allergen Reactions   • Shellfish-Derived Products Nausea And Vomiting       Medications:   Home Medications:  Current Outpatient Medications on File Prior to Visit   Medication Sig   • amLODIPine (NORVASC) 5 MG tablet    • cetirizine (zyrTEC) 5 MG tablet Take 5 mg by mouth Daily.   • hydroCHLOROthiazide (HYDRODIURIL) 25 MG tablet Take 25 mg by mouth Daily.   • meloxicam (MOBIC) 15 MG tablet 1 PO Daily with food.   • omeprazole (priLOSEC) 20 MG capsule    • Probiotic Product (PROBIOTIC-10 PO) Take  by mouth.   • Tri-Sprintec 0.18/0.215/0.25 MG-35 MCG per tablet TAKE 1 TABLET BY MOUTH 1 TIME EACH DAY   • valACYclovir (VALTREX) 1000 MG tablet Take 1,000 mg by mouth Daily.     No current facility-administered medications on file prior to visit.     Current Medications:  Scheduled Meds:  Continuous Infusions:No current facility-administered medications for this visit.    PRN Meds:.    I have reviewed the patient's medical history in detail and updated the computerized patient record.  Review and  summarization of old records include:    No past medical history on file.     Past Surgical History:   Procedure Laterality Date   • KNEE SURGERY          Social History     Occupational History   • Not on file   Tobacco Use   • Smoking status: Some Days     Types: Cigars   • Smokeless tobacco: Never   Vaping Use   • Vaping Use: Never used   Substance and Sexual Activity   • Alcohol use: Not on file     Comment: weekends   • Drug use: Defer   • Sexual activity: Defer      Social History     Social History Narrative   • Not on file        Family History   Problem Relation Age of Onset   • Diabetes Mother    • Cancer Maternal Aunt         breast       ROS: 14 point review of systems was performed and was negative except for documented findings in HPI and today's encounter.     Allergies:   Allergies   Allergen Reactions   • Shellfish-Derived Products Nausea And Vomiting     Constitutional:  Denies fever, shaking or chills   Eyes:  Denies change in visual acuity   HENT:  Denies nasal congestion or sore throat   Respiratory:  Denies cough or shortness of breath   Cardiovascular:  Denies chest pain or severe LE edema   GI:  Denies abdominal pain, nausea, vomiting, bloody stools or diarrhea   Musculoskeletal:  Numbness, tingling, or loss of motor function only as noted above in history of present illness.  : Denies painful urination or hematuria  Integument:  Denies rash, lesion or ulceration   Neurologic:  Denies headache or focal weakness  Endocrine:  Denies lymphadenopathy  Psych:  Denies confusion or change in mental status   Hem:  Denies active bleeding      Physical Exam: 52 y.o. female  Wt Readings from Last 3 Encounters:   02/16/23 104 kg (230 lb)   12/16/22 104 kg (230 lb 1.6 oz)   09/15/22 101 kg (222 lb)       There is no height or weight on file to calculate BMI.    There were no vitals filed for this visit.  Vital signs reviewed.   General Appearance:    Alert, cooperative, in no acute distress                     Ortho exam      Physical exam of the right knee reveals no effusion, no erythema.  It mild loss of extension and full flexion  Patient has mild varus alignment.  They have mild tenderness to palpation about the medial compartment, no tenderness laterally..  The patient has a negative bounce home, negative Misty and a stable ligamentous exam.  Quad tone is reasonable and symmetric.  There are no overlying skin changes no lymphedema no lymphadenopathy.  There is good hip range of motion which is full symmetric and asymptomatic and a normal ankle exam.             Assessment: Right medial OA I think it is reasonable to inject this we did talk as mentioned above in detail about weight loss options how to do we talked about weight watchers talked about NoomTalked about the medications that some people will try and talk about making lifestyle changes    Plan:   Follow up as indicated.  Ice, elevate, and rest as needed.  Discussed conservative measures of pain control including ice, bracing.  Also talked about the importance of strengthening and maintaining ideal body weight    Danyelle Perry M.D.  Large Joint Arthrocentesis: R knee  Date/Time: 3/17/2023 2:52 PM  Consent given by: patient  Site marked: site marked  Timeout: Immediately prior to procedure a time out was called to verify the correct patient, procedure, equipment, support staff and site/side marked as required   Supporting Documentation  Indications: pain, joint swelling and diagnostic evaluation   Procedure Details  Location: knee - R knee  Preparation: Patient was prepped and draped in the usual sterile fashion  Needle gauge: 21G.  Medications administered: 80 mg methylPREDNISolone acetate 80 MG/ML; 2 mL lidocaine PF 1% 1 %  Patient tolerance: patient tolerated the procedure well with no immediate complications

## 2023-03-17 ENCOUNTER — CLINICAL SUPPORT (OUTPATIENT)
Dept: ORTHOPEDIC SURGERY | Facility: CLINIC | Age: 53
End: 2023-03-17
Payer: COMMERCIAL

## 2023-03-17 VITALS — TEMPERATURE: 97.8 F | BODY MASS INDEX: 43.1 KG/M2 | HEIGHT: 62 IN | WEIGHT: 234.2 LBS

## 2023-03-17 DIAGNOSIS — M17.11 PRIMARY OSTEOARTHRITIS OF RIGHT KNEE: Primary | ICD-10-CM

## 2023-03-17 DIAGNOSIS — E66.9 OBESITY (BMI 30-39.9): ICD-10-CM

## 2023-03-17 PROCEDURE — 99213 OFFICE O/P EST LOW 20 MIN: CPT | Performed by: ORTHOPAEDIC SURGERY

## 2023-03-17 PROCEDURE — 20610 DRAIN/INJ JOINT/BURSA W/O US: CPT | Performed by: ORTHOPAEDIC SURGERY

## 2023-03-17 RX ORDER — METHYLPREDNISOLONE ACETATE 80 MG/ML
80 INJECTION, SUSPENSION INTRA-ARTICULAR; INTRALESIONAL; INTRAMUSCULAR; SOFT TISSUE
Status: COMPLETED | OUTPATIENT
Start: 2023-03-17 | End: 2023-03-17

## 2023-03-17 RX ORDER — LIDOCAINE HYDROCHLORIDE 10 MG/ML
2 INJECTION, SOLUTION EPIDURAL; INFILTRATION; INTRACAUDAL; PERINEURAL
Status: COMPLETED | OUTPATIENT
Start: 2023-03-17 | End: 2023-03-17

## 2023-03-17 RX ADMIN — LIDOCAINE HYDROCHLORIDE 2 ML: 10 INJECTION, SOLUTION EPIDURAL; INFILTRATION; INTRACAUDAL; PERINEURAL at 14:52

## 2023-03-17 RX ADMIN — METHYLPREDNISOLONE ACETATE 80 MG: 80 INJECTION, SUSPENSION INTRA-ARTICULAR; INTRALESIONAL; INTRAMUSCULAR; SOFT TISSUE at 14:52

## 2023-03-23 ENCOUNTER — TELEPHONE (OUTPATIENT)
Dept: ORTHOPEDIC SURGERY | Facility: CLINIC | Age: 53
End: 2023-03-23
Payer: COMMERCIAL

## 2023-03-23 DIAGNOSIS — M17.11 PRIMARY OSTEOARTHRITIS OF RIGHT KNEE: Primary | ICD-10-CM

## 2023-03-23 NOTE — TELEPHONE ENCOUNTER
Would you mind calling her and talking to her.  Tell her it may take a little bit longer for that to kick in and I would still do the easy things ice rest.  If she wants to try the gel we can put an order in and see if they will approve the gel beyond that unfortunately I do not have too much to offer

## 2023-03-23 NOTE — TELEPHONE ENCOUNTER
Caller: MONIKA ANTON    Relationship to patient: SELF    Best call back number: [phone]    Patient is needing: PATIENT HAD AN INJECTION ON HER RIGHT KNEE AND IS STILL EXPERIENCING PAIN.  PATIENT WOULD LIKE A CALL BACK TO DISCUSS.

## 2023-03-23 NOTE — TELEPHONE ENCOUNTER
Pt would like order put in for gel injections to see if insurance covers them. She was also going to try and call her insurance.

## 2023-03-27 ENCOUNTER — TELEPHONE (OUTPATIENT)
Dept: ORTHOPEDIC SURGERY | Facility: CLINIC | Age: 53
End: 2023-03-27

## 2023-03-27 NOTE — TELEPHONE ENCOUNTER
Additional information provided to [place] to support medical necessity for the use of Durolane     Routing to pa  Thank you

## 2023-06-13 RX ORDER — HYALURONATE SODIUM, STABILIZED 60 MG/3 ML
60 SYRINGE (ML) INTRAARTICULAR ONCE
Qty: 3 ML | Refills: 0 | Status: SHIPPED | OUTPATIENT
Start: 2023-06-13 | End: 2023-06-13